# Patient Record
Sex: FEMALE | Race: OTHER | NOT HISPANIC OR LATINO
[De-identification: names, ages, dates, MRNs, and addresses within clinical notes are randomized per-mention and may not be internally consistent; named-entity substitution may affect disease eponyms.]

---

## 2020-03-06 PROBLEM — Z00.00 ENCOUNTER FOR PREVENTIVE HEALTH EXAMINATION: Status: ACTIVE | Noted: 2020-03-06

## 2020-03-13 ENCOUNTER — APPOINTMENT (OUTPATIENT)
Dept: PULMONOLOGY | Facility: CLINIC | Age: 25
End: 2020-03-13
Payer: COMMERCIAL

## 2020-03-13 VITALS
OXYGEN SATURATION: 98 % | HEIGHT: 60 IN | DIASTOLIC BLOOD PRESSURE: 60 MMHG | TEMPERATURE: 98.4 F | BODY MASS INDEX: 22.78 KG/M2 | SYSTOLIC BLOOD PRESSURE: 90 MMHG | WEIGHT: 116 LBS | HEART RATE: 67 BPM

## 2020-03-13 DIAGNOSIS — Z82.5 FAMILY HISTORY OF ASTHMA AND OTHER CHRONIC LOWER RESPIRATORY DISEASES: ICD-10-CM

## 2020-03-13 DIAGNOSIS — J45.909 UNSPECIFIED ASTHMA, UNCOMPLICATED: ICD-10-CM

## 2020-03-13 PROCEDURE — 99204 OFFICE O/P NEW MOD 45 MIN: CPT

## 2020-03-17 NOTE — PHYSICAL EXAM
[No Acute Distress] : no acute distress [Well Developed] : well developed [Normal Oropharynx] : normal oropharynx [III] : Mallampati Class: III [Normal Appearance] : normal appearance [No Neck Mass] : no neck mass [Normal Rate/Rhythm] : normal rate/rhythm [No Murmurs] : no murmurs [No Resp Distress] : no resp distress [Clear to Auscultation Bilaterally] : clear to auscultation bilaterally [No Abnormalities] : no abnormalities [Not Tender] : not tender [Soft] : soft [Normal Gait] : normal gait [Gait - Sufficient For Exercise Testing] : gait sufficient for exercise testing [No Clubbing] : no clubbing [No Edema] : no edema [Normal Color/ Pigmentation] : normal color/ pigmentation [No Rash] : no rash [No Focal Deficits] : no focal deficits [No Sensory Deficits] : no sensory deficits [Oriented x3] : oriented x3 [Normal Affect] : normal affect

## 2020-03-17 NOTE — HISTORY OF PRESENT ILLNESS
[TextBox_4] : 24 year old female, never smoker, works as an  (originally from [place]) with family history of asthma referred to clinic by Dr. Early for intermittent episodes of SOB. Patient feels SOb with exertion, denying wheezing, fever or cough. Patient SOB is variable and improves by its own intermittently. Patient came today for further management of asthma and get PFT.

## 2020-03-17 NOTE — REASON FOR VISIT
[Consultation] : a consultation [Asthma] : asthma [TextBox_13] : Yvonne Early MD. [address], [place] [address]\par Phone: [phone]

## 2020-03-17 NOTE — REVIEW OF SYSTEMS
[Fever] : no fever [Chills] : no chills [Dry Eyes] : no dry eyes [Nasal Congestion] : no nasal congestion [Cough] : no cough [Hemoptysis] : no hemoptysis [Sputum] : no sputum [Dyspnea] : dyspnea [Chest Discomfort] : no chest discomfort [Palpitations] : no palpitations [Hay Fever] : no hay fever [Nasal Discharge] : no nasal discharge [GERD] : no gerd [Diarrhea] : no diarrhea [Nocturia] : no nocturia [Irregular Menses] : no irregular menses [Arthralgias] : no arthralgias [Trauma/ Injury] : no trauma/ injury [Raynaud] : no raynaud [Telangiectasias] : no telangiectasias [TextBox_151] : Rest OF ROS negative

## 2020-03-17 NOTE — CONSULT LETTER
[Dear  ___] : Dear  [unfilled], [Consult Letter:] : I had the pleasure of evaluating your patient, [unfilled]. [Please see my note below.] : Please see my note below. [Consult Closing:] : Thank you very much for allowing me to participate in the care of this patient.  If you have any questions, please do not hesitate to contact me. [FreeTextEntry3] : Sincerely\par \par [facility]\par , [facility] [employer]\par Associate , Pulmonary and Critical Care Fellowship\par Pulmonary and Critical Care\par [facility]\par

## 2020-03-17 NOTE — DISCUSSION/SUMMARY
[FreeTextEntry1] : 24 year old female, never smoker, works as an  (originally from [place]) with family history of asthma referred to clinic by Dr. Early for intermittent episodes of SOB.\par \par A/P\par Clinically, possibility of mild intermittent asthma can not be ruled out. PFT can be done today due to policy not to perform PFT due to COVID.\par Plan:\par - Symbicort 2 puff as needed\par - Follow up after 4-6 weeks\par - Patient will call me if her SOB is getting worse.

## 2020-03-22 ENCOUNTER — TRANSCRIPTION ENCOUNTER (OUTPATIENT)
Age: 25
End: 2020-03-22

## 2020-04-16 RX ORDER — BUDESONIDE AND FORMOTEROL FUMARATE DIHYDRATE 160; 4.5 UG/1; UG/1
160-4.5 AEROSOL RESPIRATORY (INHALATION) TWICE DAILY
Qty: 3 | Refills: 1 | Status: ACTIVE | COMMUNITY
Start: 2020-03-13 | End: 1900-01-01

## 2020-04-24 ENCOUNTER — APPOINTMENT (OUTPATIENT)
Dept: PULMONOLOGY | Facility: CLINIC | Age: 25
End: 2020-04-24